# Patient Record
Sex: MALE | Race: WHITE | NOT HISPANIC OR LATINO | Employment: OTHER | URBAN - METROPOLITAN AREA
[De-identification: names, ages, dates, MRNs, and addresses within clinical notes are randomized per-mention and may not be internally consistent; named-entity substitution may affect disease eponyms.]

---

## 2018-06-18 ENCOUNTER — HOSPITAL ENCOUNTER (EMERGENCY)
Facility: OTHER | Age: 33
Discharge: HOME | End: 2018-06-18
Attending: EMERGENCY MEDICINE

## 2018-06-18 VITALS
RESPIRATION RATE: 16 BRPM | HEIGHT: 69 IN | DIASTOLIC BLOOD PRESSURE: 80 MMHG | HEART RATE: 80 BPM | BODY MASS INDEX: 22.96 KG/M2 | SYSTOLIC BLOOD PRESSURE: 145 MMHG | WEIGHT: 155 LBS | TEMPERATURE: 98 F | OXYGEN SATURATION: 100 %

## 2018-06-18 DIAGNOSIS — K02.9 DENTAL CARIES: ICD-10-CM

## 2018-06-18 DIAGNOSIS — K02.9 PAIN DUE TO DENTAL CARIES: ICD-10-CM

## 2018-06-18 DIAGNOSIS — K05.30 PERIODONTITIS: Primary | ICD-10-CM

## 2018-06-18 PROCEDURE — 99283 EMERGENCY DEPT VISIT LOW MDM: CPT | Mod: 25

## 2018-06-18 PROCEDURE — 41800 DRAINAGE OF GUM LESION: CPT

## 2018-06-18 PROCEDURE — 25000003 PHARM REV CODE 250: Performed by: EMERGENCY MEDICINE

## 2018-06-18 RX ORDER — PENICILLIN V POTASSIUM 500 MG/1
500 TABLET, FILM COATED ORAL
Status: COMPLETED | OUTPATIENT
Start: 2018-06-18 | End: 2018-06-18

## 2018-06-18 RX ORDER — PENICILLIN V POTASSIUM 500 MG/1
500 TABLET, FILM COATED ORAL 4 TIMES DAILY
Qty: 28 TABLET | Refills: 0 | Status: SHIPPED | OUTPATIENT
Start: 2018-06-18 | End: 2018-06-25

## 2018-06-18 RX ORDER — LIDOCAINE HYDROCHLORIDE 10 MG/ML
5 INJECTION INFILTRATION; PERINEURAL
Status: COMPLETED | OUTPATIENT
Start: 2018-06-18 | End: 2018-06-18

## 2018-06-18 RX ORDER — OXYCODONE AND ACETAMINOPHEN 5; 325 MG/1; MG/1
1 TABLET ORAL
Status: COMPLETED | OUTPATIENT
Start: 2018-06-18 | End: 2018-06-18

## 2018-06-18 RX ADMIN — PENICILLIN V POTASIUM 500 MG: 500 TABLET OROPHARYNGEAL at 05:06

## 2018-06-18 RX ADMIN — OXYCODONE HYDROCHLORIDE AND ACETAMINOPHEN 1 TABLET: 5; 325 TABLET ORAL at 05:06

## 2018-06-18 RX ADMIN — LIDOCAINE HYDROCHLORIDE 5 ML: 10 INJECTION, SOLUTION INFILTRATION; PERINEURAL at 05:06

## 2018-06-18 NOTE — ED NOTES
Pt has been having pain to lower gums with extensive swelling since Saturday. Pt states there is no drainage that he can tell, just pain and irritation all around the area

## 2018-06-18 NOTE — ED PROVIDER NOTES
Encounter Date: 6/18/2018    SCRIBE #1 NOTE: Zulay MONTOYA am scribing for, and in the presence of, Dr. Mcclain.       History     Chief Complaint   Patient presents with    Dental Pain     with some swelling to the face with severe pain since Saturday     Time seen by provider: 5:35 AM    This is a 33 y.o. male who presents with complaint of dental pain that began two days ago. The pain is located over the right lower jaw. He denies improvement with acetaminophen. He reports associated right sided facial swelling. He denies fever, difficulty swallowing, voice change, or SOB. He is requesting I&D. He reports distant history of dental trauma.      The history is provided by the patient.     Review of patient's allergies indicates:  No Known Allergies  Past Medical History:   Diagnosis Date    Epilepsy      History reviewed. No pertinent surgical history.  No family history on file.  Social History   Substance Use Topics    Smoking status: Current Every Day Smoker    Smokeless tobacco: Not on file    Alcohol use Yes     Review of Systems   Constitutional: Negative for fever.   HENT: Positive for dental problem and facial swelling. Negative for drooling, ear pain, sore throat, trouble swallowing and voice change.    Respiratory: Negative for shortness of breath.    Cardiovascular: Negative for chest pain.   Gastrointestinal: Negative for nausea.   Genitourinary: Negative for dysuria.   Musculoskeletal: Negative for back pain.   Skin: Negative for rash.   Neurological: Negative for weakness.   Hematological: Does not bruise/bleed easily.       Physical Exam     Initial Vitals [06/18/18 0531]   BP Pulse Resp Temp SpO2   (!) 153/71 97 16 98.2 °F (36.8 °C) 97 %      MAP       --         Physical Exam    Nursing note and vitals reviewed.  Constitutional: He appears well-developed and well-nourished. He is not diaphoretic. He appears distressed.   Appears uncomfortable.   HENT:   Head: Normocephalic and  atraumatic.   Mouth/Throat: Abnormal dentition (poor). Dental caries (multiple present) present.   Right sided facial swelling. Tenderness to right lower jaw. Prominent gingival erythema right lower molars. No sublingual edema. Tolerating secretions. Normal voice.   Eyes: Conjunctivae and EOM are normal.   Neck: Normal range of motion. Neck supple.   Cardiovascular: Normal rate, regular rhythm and normal heart sounds. Exam reveals no gallop and no friction rub.    No murmur heard.  Pulmonary/Chest: Breath sounds normal. No respiratory distress. He has no wheezes. He has no rhonchi. He has no rales.   Musculoskeletal: Normal range of motion.   Neurological: He is alert and oriented to person, place, and time.   Skin: Skin is warm and dry.   Psychiatric: He has a normal mood and affect. Thought content normal.         ED Course   I & D - Incision and Drainage  Date/Time: 6/18/2018 7:14 AM  Performed by: OXANA PIERSON  Authorized by: OXANA PIERSON   Consent Done: Not Needed  Type: abscess  Body area: mouth  Location details: alveolar process  Anesthesia: local infiltration    Anesthesia:  Local Anesthetic: lidocaine 1% with epinephrine  Patient sedated: no  Scalpel size: 11  Incision type: single straight  Complexity: simple  Drainage: serosanguinous  Drainage amount: scant  Wound treatment: incision and  wound left open  Complications: No  Estimated blood loss (mL): 3  Specimens: No  Implants: No  Patient tolerance: Patient tolerated the procedure well with no immediate complications        Labs Reviewed - No data to display       No orders to display        Medical Decision Making:   Initial Assessment:   Urgent evaluation of 33 -year-old gentleman here with right-sided facial pain.  Patient has known poor dental hygiene, reports multiple fractured teeth since the age of 14 presenting today with worsening pain and swelling to the right side of his face, and lower jaw.  Patient denies fevers, has tried Tylenol  without relief.  On exam patient has swelling to right mandibular region, with multiple caries present, moderate fluctuance to gingival region of #29-31 with discoloration and caries present to these teeth.  Patient is tolerating secretions, no tongue elevation, sublingual edema, or fevers to suggest deep abscess.  We'll plan to administer antibiotics, dental block, perform I&D and discharge with [facility].            Scribe Attestation:   Scribe #1: I performed the above scribed service and the documentation accurately describes the services I performed. I attest to the accuracy of the note.    Attending Attestation:           Physician Attestation for Scribe:  Physician Attestation Statement for Scribe #1: I, Dr. Mcclain, reviewed documentation, as scribed by Zulay Regan in my presence, and it is both accurate and complete.                    Clinical Impression:     1. Periodontitis    2. Dental caries    3. Pain due to dental caries          Disposition:   Disposition: Discharged  Condition: Stable                        Gayle Mcclain MD  06/18/18 3032

## 2020-12-03 PROBLEM — L02.91 ABSCESS: Status: ACTIVE | Noted: 2020-12-03

## 2020-12-04 PROBLEM — E87.20 LACTIC ACIDOSIS: Status: RESOLVED | Noted: 2020-12-04 | Resolved: 2020-12-04

## 2020-12-04 PROBLEM — E87.20 LACTIC ACIDOSIS: Status: ACTIVE | Noted: 2020-12-04

## 2020-12-04 PROBLEM — L02.416 ABSCESS OF LEFT HIP: Status: ACTIVE | Noted: 2020-12-03

## 2020-12-04 PROBLEM — F19.10 POLYSUBSTANCE ABUSE: Status: ACTIVE | Noted: 2020-12-04

## 2020-12-04 PROBLEM — L02.416 ABSCESS OF LEFT LEG: Status: ACTIVE | Noted: 2020-12-04
